# Patient Record
Sex: FEMALE | Race: BLACK OR AFRICAN AMERICAN | Employment: UNEMPLOYED | URBAN - METROPOLITAN AREA
[De-identification: names, ages, dates, MRNs, and addresses within clinical notes are randomized per-mention and may not be internally consistent; named-entity substitution may affect disease eponyms.]

---

## 2019-01-29 ENCOUNTER — OFFICE VISIT (OUTPATIENT)
Dept: FAMILY MEDICINE CLINIC | Age: 29
End: 2019-01-29

## 2019-01-29 VITALS
DIASTOLIC BLOOD PRESSURE: 73 MMHG | WEIGHT: 150.2 LBS | SYSTOLIC BLOOD PRESSURE: 122 MMHG | RESPIRATION RATE: 18 BRPM | BODY MASS INDEX: 27.64 KG/M2 | HEIGHT: 62 IN | TEMPERATURE: 97.5 F | HEART RATE: 74 BPM | OXYGEN SATURATION: 98 %

## 2019-01-29 DIAGNOSIS — R73.01 IMPAIRED FASTING GLUCOSE: ICD-10-CM

## 2019-01-29 DIAGNOSIS — Z11.3 ROUTINE SCREENING FOR STI (SEXUALLY TRANSMITTED INFECTION): ICD-10-CM

## 2019-01-29 DIAGNOSIS — D50.9 IRON DEFICIENCY ANEMIA, UNSPECIFIED IRON DEFICIENCY ANEMIA TYPE: ICD-10-CM

## 2019-01-29 DIAGNOSIS — Z76.89 ENCOUNTER TO ESTABLISH CARE: ICD-10-CM

## 2019-01-29 DIAGNOSIS — F41.8 DEPRESSION WITH ANXIETY: ICD-10-CM

## 2019-01-29 DIAGNOSIS — N94.6 DYSMENORRHEA: Primary | ICD-10-CM

## 2019-01-29 NOTE — PATIENT INSTRUCTIONS
Patient has a history of anemia and she will go for lab testing as ordered  The patient has a history of impaired fasting glucose and she will go for lab testing as ordered  I have recommended to the patient that she might seek counseling services at any of the following locations given her history of depression with anxiety. If at any time she finds that this is not sufficient I will be happy to discuss with her medication management as well which I am happy to do in this office    For Psychology/Psychiatry, can call:  Jonathan White Fermin Raji  [address] [place] [address]  [id number]  [phone]    Candelaria Thornton  [address] [facility]   [phone] OR [phone] [facility]      The patient has admitted she would like to lose a little bit of weight and I encouraged her on exercise and following a healthy diet. A moderate amount of weight loss is reasonable, this can be achieved by eliminating 3-500 zeny daily. Given that she is still breast-feeding I would be mindful that rapid weight loss can take her milk supply so she should be cautious about this    The patient admits to dysmenorrhea and also a history of ovarian cyst.  She is status post tubal ligation. I recommended to her that initially she start with ibuprofen, 600 mg every 6-8 hours as needed for abdominal cramping. If this is not sufficient we can explore other options.   At this time she does not want to see gynecology however it is appropriate in the future I will be happy to refer her    She is due for a Pap smear and we will schedule this at her convenience    Routine STI testing today    Call with questions or concerns
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
## 2019-01-29 NOTE — PROGRESS NOTES
Pipo Rosas is a 33 y.o. female    Patient states that at times she has cervical pain 10/10, no pain at this time. Chief Complaint   Patient presents with    New Patient    Establish Care       1. Have you been to the ER, urgent care clinic since your last visit? Hospitalized since your last visit? [facility] left 2018  M  2. Have you seen or consulted any other health care providers outside of the [address] [place] [address] since your last visit? Include any pap smears or colon screening. No      Visit Vitals  /73 (BP 1 Location: Right arm, BP Patient Position: Sitting)   Pulse 74   Temp 97.5 °F (36.4 °C) (Oral)   Resp 18   Ht 5' 2\" (1.575 m)   Wt 150 lb 3.2 oz (68.1 kg)   SpO2 98%   BMI 27.47 kg/m²           Health Maintenance Due   Topic Date Due    Pneumococcal 19-64 Medium Risk (1 of 1 - PPSV23) 12/21/2008    DTaP/Tdap/Td series (1 - Tdap) 12/21/2010    PAP AKA CERVICAL CYTOLOGY  05/01/2016    Influenza Age 9 to Adult  08/01/2018         Medication Reconciliation completed, changes noted.   Please  Update medication list.

## 2019-03-21 ENCOUNTER — OFFICE VISIT (OUTPATIENT)
Dept: FAMILY MEDICINE CLINIC | Age: 29
End: 2019-03-21

## 2019-03-21 VITALS
BODY MASS INDEX: 27.12 KG/M2 | HEIGHT: 62 IN | OXYGEN SATURATION: 98 % | RESPIRATION RATE: 18 BRPM | DIASTOLIC BLOOD PRESSURE: 73 MMHG | HEART RATE: 73 BPM | WEIGHT: 147.4 LBS | TEMPERATURE: 97.3 F | SYSTOLIC BLOOD PRESSURE: 118 MMHG

## 2019-03-21 DIAGNOSIS — Z12.4 SCREENING FOR CERVICAL CANCER: ICD-10-CM

## 2019-03-21 DIAGNOSIS — Z01.419 WELL WOMAN EXAM WITH ROUTINE GYNECOLOGICAL EXAM: Primary | ICD-10-CM

## 2019-03-21 DIAGNOSIS — Z11.3 ROUTINE SCREENING FOR STI (SEXUALLY TRANSMITTED INFECTION): ICD-10-CM

## 2019-03-21 DIAGNOSIS — Z12.39 SCREENING FOR MALIGNANT NEOPLASM OF BREAST: ICD-10-CM

## 2019-03-21 RX ORDER — BUPROPION HYDROCHLORIDE 150 MG/1
150 TABLET, EXTENDED RELEASE ORAL DAILY
Qty: 30 TAB | Refills: 0 | Status: SHIPPED | OUTPATIENT
Start: 2019-03-21 | End: 2019-05-11 | Stop reason: SDUPTHER
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
## 2019-03-21 NOTE — PROGRESS NOTES
Eliseo Gutierrez is a 33 y.o. female Chief Complaint Patient presents with  Complete Physical  
 Gyn Exam  
 
 
1. Have you been to the ER, urgent care clinic since your last visit? Hospitalized since your last visit? No 
M 
2. Have you seen or consulted any other health care providers outside of the [address] [place] [address] since your last visit? Include any pap smears or colon screening. No 
 
 
Visit Vitals Resp 18 Ht 5' 2\" (1.575 m) Wt 147 lb 6.4 oz (66.9 kg) SpO2 98% BMI 26.96 kg/m² Health Maintenance Due Topic Date Due  
 PAP AKA CERVICAL CYTOLOGY  05/01/2016  Influenza Age 5 to Adult  08/01/2018 Medication Reconciliation completed, changes noted.   Please  Update medication list.

## 2019-03-21 NOTE — PATIENT INSTRUCTIONS
For Psychology/Psychiatry, can call: 
[facility] [address] [facility] [address] [place] [phone] [facility] [address] Hilary Broderick [phone] [address] [place] [address] 
 [phone] OR [phone] [address], [address] [place] [address] [address] PHONE [phone] FAX [phone]

## 2019-03-23 LAB
ALBUMIN SERPL-MCNC: 4.8 G/DL (ref 3.5–5.5)
ALBUMIN/GLOB SERPL: 1.6 {RATIO} (ref 1.2–2.2)
ALP SERPL-CCNC: 149 IU/L (ref 39–117)
ALT SERPL-CCNC: 14 IU/L (ref 0–32)
AST SERPL-CCNC: 17 IU/L (ref 0–40)
BASOPHILS # BLD AUTO: 0 X10E3/UL (ref 0–0.2)
BASOPHILS NFR BLD AUTO: 0 %
BILIRUB SERPL-MCNC: 0.4 MG/DL (ref 0–1.2)
BUN SERPL-MCNC: 12 MG/DL (ref 6–20)
BUN/CREAT SERPL: 17 (ref 9–23)
CALCIUM SERPL-MCNC: 9.6 MG/DL (ref 8.7–10.2)
CHLORIDE SERPL-SCNC: 104 MMOL/L (ref 96–106)
CHOLEST SERPL-MCNC: 172 MG/DL (ref 100–199)
CO2 SERPL-SCNC: 23 MMOL/L (ref 20–29)
CREAT SERPL-MCNC: 0.71 MG/DL (ref 0.57–1)
EOSINOPHIL # BLD AUTO: 0.1 X10E3/UL (ref 0–0.4)
EOSINOPHIL NFR BLD AUTO: 2 %
ERYTHROCYTE [DISTWIDTH] IN BLOOD BY AUTOMATED COUNT: 14.6 % (ref 12.3–15.4)
EST. AVERAGE GLUCOSE BLD GHB EST-MCNC: 123 MG/DL
GLOBULIN SER CALC-MCNC: 3 G/DL (ref 1.5–4.5)
GLUCOSE SERPL-MCNC: 73 MG/DL (ref 65–99)
HBA1C MFR BLD: 5.9 % (ref 4.8–5.6)
HCT VFR BLD AUTO: 39.8 % (ref 34–46.6)
HDLC SERPL-MCNC: 62 MG/DL
HGB BLD-MCNC: 13 G/DL (ref 11.1–15.9)
HIV 1+2 AB+HIV1 P24 AG SERPL QL IA: NON REACTIVE
IMM GRANULOCYTES # BLD AUTO: 0 X10E3/UL (ref 0–0.1)
IMM GRANULOCYTES NFR BLD AUTO: 0 %
INTERPRETATION, 910389: NORMAL
IRON SATN MFR SERPL: 39 % (ref 15–55)
IRON SERPL-MCNC: 105 UG/DL (ref 27–159)
LDLC SERPL CALC-MCNC: 101 MG/DL (ref 0–99)
LYMPHOCYTES # BLD AUTO: 2 X10E3/UL (ref 0.7–3.1)
LYMPHOCYTES NFR BLD AUTO: 46 %
MCH RBC QN AUTO: 26.5 PG (ref 26.6–33)
MCHC RBC AUTO-ENTMCNC: 32.7 G/DL (ref 31.5–35.7)
MCV RBC AUTO: 81 FL (ref 79–97)
MONOCYTES # BLD AUTO: 0.5 X10E3/UL (ref 0.1–0.9)
MONOCYTES NFR BLD AUTO: 11 %
NEUTROPHILS # BLD AUTO: 1.8 X10E3/UL (ref 1.4–7)
NEUTROPHILS NFR BLD AUTO: 41 %
PLATELET # BLD AUTO: 200 X10E3/UL (ref 150–379)
POTASSIUM SERPL-SCNC: 4.1 MMOL/L (ref 3.5–5.2)
PROT SERPL-MCNC: 7.8 G/DL (ref 6–8.5)
RBC # BLD AUTO: 4.9 X10E6/UL (ref 3.77–5.28)
RPR SER QL: NON REACTIVE
SODIUM SERPL-SCNC: 143 MMOL/L (ref 134–144)
TIBC SERPL-MCNC: 269 UG/DL (ref 250–450)
TRIGL SERPL-MCNC: 46 MG/DL (ref 0–149)
TSH SERPL DL<=0.005 MIU/L-ACNC: 0.93 UIU/ML (ref 0.45–4.5)
UIBC SERPL-MCNC: 164 UG/DL (ref 131–425)
VLDLC SERPL CALC-MCNC: 9 MG/DL (ref 5–40)
WBC # BLD AUTO: 4.4 X10E3/UL (ref 3.4–10.8)

## 2019-03-24 LAB
C TRACH RRNA SPEC QL NAA+PROBE: NEGATIVE
CYTOLOGIST CVX/VAG CYTO: NORMAL
CYTOLOGY CVX/VAG DOC THIN PREP: NORMAL
DX ICD CODE: NORMAL
HPV I/H RISK 4 DNA CVX QL PROBE+SIG AMP: NEGATIVE
Lab: NORMAL
N GONORRHOEA RRNA SPEC QL NAA+PROBE: NEGATIVE
OTHER STN SPEC: NORMAL
PATH REPORT.FINAL DX SPEC: NORMAL
STAT OF ADQ CVX/VAG CYTO-IMP: NORMAL

## 2019-03-25 NOTE — PROGRESS NOTES
Diabetes screening test in pre-diabetic range, need to keep an eye on this, be mindful of diet and try to keep up with exercise  Cholesterol boderline but healthy cholesterol overall great  Electrolytes, liver and kidney function all OK  Thyroid normal  STD screening negative  Blood counts stable, iron normal  Pap and HPV negative

## 2019-03-27 ENCOUNTER — TELEPHONE (OUTPATIENT)
Dept: FAMILY MEDICINE CLINIC | Age: 29
End: 2019-03-27

## 2019-03-28 NOTE — TELEPHONE ENCOUNTER
Labs were reviewed, letter was mailed it looks like, would you follow up with patient when you have a chance

## 2019-03-28 NOTE — TELEPHONE ENCOUNTER
Called number advised to call by pt, however, number was not in service and per voice mail, was advised to call [phone]. Called number advised, which was Mr. Blankenship, and he states pt is not with him, and I need to call her phone.

## 2019-04-30 ENCOUNTER — OFFICE VISIT (OUTPATIENT)
Dept: FAMILY MEDICINE CLINIC | Age: 29
End: 2019-04-30

## 2019-04-30 VITALS
WEIGHT: 148 LBS | RESPIRATION RATE: 18 BRPM | HEART RATE: 85 BPM | DIASTOLIC BLOOD PRESSURE: 73 MMHG | TEMPERATURE: 98.2 F | HEIGHT: 62 IN | SYSTOLIC BLOOD PRESSURE: 110 MMHG | BODY MASS INDEX: 27.23 KG/M2 | OXYGEN SATURATION: 100 %

## 2019-04-30 DIAGNOSIS — F32.0 DEPRESSION, MAJOR, SINGLE EPISODE, MILD (HCC): Primary | ICD-10-CM

## 2019-04-30 DIAGNOSIS — R10.2 PELVIC PAIN: ICD-10-CM

## 2019-04-30 RX ORDER — ESTRADIOL 0.1 MG/G
CREAM VAGINAL
Qty: 42.5 G | Refills: 0 | Status: SHIPPED | OUTPATIENT
Start: 2019-04-30
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
## 2019-04-30 NOTE — PROGRESS NOTES
Chief Complaint Patient presents with  Depression Follow up 1. Have you been to the ER, urgent care clinic since your last visit? Hospitalized since your last visit? No 
 
2. Have you seen or consulted any other health care providers outside of the [address] [place] [address] since your last visit? Include any pap smears or colon screening.  No

## 2019-04-30 NOTE — PATIENT INSTRUCTIONS
For Psychology/Psychiatry, can call: 
[facility] [address] [place] [phone] [facility] [address] [phone] [address] [place] [address] 
 [phone] OR [phone] [address], [address] [place] [address] [address] PHONE [phone] FAX [phone] [address] [facility] Phone: [phone]

## 2019-05-02 DIAGNOSIS — Z34.80 SUPERVISION OF OTHER NORMAL PREGNANCY: ICD-10-CM

## 2019-05-02 DIAGNOSIS — J45.909 ASTHMA: ICD-10-CM

## 2019-05-02 DIAGNOSIS — R06.2 WHEEZING: ICD-10-CM

## 2019-05-02 RX ORDER — FLUTICASONE PROPIONATE 110 UG/1
2 AEROSOL, METERED RESPIRATORY (INHALATION) 2 TIMES DAILY
Qty: 1 INHALER | Refills: 2 | Status: SHIPPED | OUTPATIENT
Start: 2019-05-02 | End: 2021-02-22 | Stop reason: SDUPTHER

## 2019-05-02 RX ORDER — ALBUTEROL SULFATE 90 UG/1
2 AEROSOL, METERED RESPIRATORY (INHALATION)
Qty: 1 INHALER | Refills: 1 | Status: SHIPPED | OUTPATIENT
Start: 2019-05-02 | End: 2021-02-22 | Stop reason: SDUPTHER

## 2019-05-02 NOTE — TELEPHONE ENCOUNTER
----- Message from [facility] sent at 5/1/2019  6:35 PM EDT -----  Regarding: Dr. Leonardo Payne  Pt [phone] needs her inhalers called into [facility] [phone]. Pt is completely out of the meds. And has been waiting for the refills.

## 2019-05-06 ENCOUNTER — TELEPHONE (OUTPATIENT)
Dept: FAMILY MEDICINE CLINIC | Age: 29
End: 2019-05-06

## 2019-05-14 RX ORDER — BUPROPION HYDROCHLORIDE 150 MG/1
TABLET, EXTENDED RELEASE ORAL
Qty: 30 TAB | Refills: 0 | Status: SHIPPED | OUTPATIENT
Start: 2019-05-14 | End: 2019-09-24

## 2019-09-24 ENCOUNTER — OFFICE VISIT (OUTPATIENT)
Dept: FAMILY MEDICINE CLINIC | Age: 29
End: 2019-09-24

## 2019-09-24 VITALS
RESPIRATION RATE: 16 BRPM | OXYGEN SATURATION: 100 % | BODY MASS INDEX: 26.13 KG/M2 | TEMPERATURE: 97.5 F | SYSTOLIC BLOOD PRESSURE: 107 MMHG | WEIGHT: 142 LBS | HEIGHT: 62 IN | DIASTOLIC BLOOD PRESSURE: 81 MMHG | HEART RATE: 85 BPM

## 2019-09-24 DIAGNOSIS — F32.0 DEPRESSION, MAJOR, SINGLE EPISODE, MILD (HCC): Primary | ICD-10-CM

## 2019-09-24 DIAGNOSIS — F41.0 ANXIETY ATTACK: ICD-10-CM

## 2019-09-24 PROBLEM — Z98.891 HISTORY OF CESAREAN SECTION: Status: ACTIVE | Noted: 2017-11-12

## 2019-09-24 PROBLEM — K21.9 GERD (GASTROESOPHAGEAL REFLUX DISEASE): Status: ACTIVE | Noted: 2017-08-23

## 2019-09-24 PROBLEM — D50.0 IRON DEFICIENCY ANEMIA DUE TO CHRONIC BLOOD LOSS: Status: ACTIVE | Noted: 2017-11-02

## 2019-09-24 PROBLEM — I83.93 VARICOSE VEINS OF BOTH LOWER EXTREMITIES: Status: ACTIVE | Noted: 2018-01-25

## 2019-09-24 RX ORDER — HYDROXYZINE 25 MG/1
25 TABLET, FILM COATED ORAL
Qty: 30 TAB | Refills: 0 | Status: SHIPPED | OUTPATIENT
Start: 2019-09-24 | End: 2019-10-04

## 2019-09-24 RX ORDER — CITALOPRAM 20 MG/1
20 TABLET, FILM COATED ORAL DAILY
Qty: 30 TAB | Refills: 1 | Status: SHIPPED | OUTPATIENT
Start: 2019-09-24 | End: 2019-10-16 | Stop reason: SDUPTHER

## 2019-09-24 NOTE — PATIENT INSTRUCTIONS
Here today to discuss ongoing depression, depression and anxiety is not improved, took Wellbutrin for about a month but it was not sufficient for control of symptoms and in fact may have exacerbated her anxiety symptoms. After discussion today we will go ahead and trial Celexa. I have indicated to the patient that this is indicated for control of both anxiety and depression however we may need to recheck higher dose of the medicine to get good control of her anxiety. We will start at 20 mg daily and she may increase to 40 mg after seeing me in about a month if she is tolerating the medicine and is helping. It may be good to take this medicine initially at her bedtime. It is a once daily medicine and last for 24 hours For breakthrough anxiety in the intervening time I will give her a short course of hydroxyzine. She may take this as needed but I advised her that it may make her very sleepy Both of these medications are okay for breast-feeding Flu shot today

## 2019-09-24 NOTE — PROGRESS NOTES
Chief Complaint   Patient presents with    Depression     Follow up     1. Have you been to the ER, urgent care clinic since your last visit? Hospitalized since your last visit? No    2. Have you seen or consulted any other health care providers outside of the [address] [place] [address] since your last visit? Include any pap smears or colon screening.  No
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
## 2019-10-16 ENCOUNTER — OFFICE VISIT (OUTPATIENT)
Dept: FAMILY MEDICINE CLINIC | Age: 29
End: 2019-10-16

## 2019-10-16 VITALS
HEART RATE: 94 BPM | WEIGHT: 142 LBS | BODY MASS INDEX: 26.13 KG/M2 | TEMPERATURE: 98.2 F | OXYGEN SATURATION: 100 % | SYSTOLIC BLOOD PRESSURE: 119 MMHG | DIASTOLIC BLOOD PRESSURE: 69 MMHG | RESPIRATION RATE: 18 BRPM | HEIGHT: 62 IN

## 2019-10-16 DIAGNOSIS — F41.0 ANXIETY ATTACK: ICD-10-CM

## 2019-10-16 DIAGNOSIS — F32.0 DEPRESSION, MAJOR, SINGLE EPISODE, MILD (HCC): Primary | ICD-10-CM

## 2019-10-16 RX ORDER — CITALOPRAM 40 MG/1
40 TABLET, FILM COATED ORAL DAILY
Qty: 90 TAB | Refills: 0 | Status: SHIPPED | OUTPATIENT
Start: 2019-10-16 | End: 2020-01-28

## 2019-10-16 NOTE — LETTER
NOTIFICATION RETURN TO WORK / SCHOOL 
 
10/16/2019 4:00 PM 
 
Ms. Royal Garcia Colon [address] [phone] To Whom It May Concern: 
 
Mickie García is currently under the care of [address] [place] [address]. She will return to work/school on: 10/16/19 Patient was seen in the office today and has been instructed to follow a treatment plan. If there are questions or concerns please have the patient contact our office.  
 
 
 
Sincerely, 
 
 
Georgie Saunders MD

## 2019-10-16 NOTE — PATIENT INSTRUCTIONS
Symptoms of anxiety do not seem to be controlled and seems to be provoked when the patient is away from her children trying to work  She had worsening symptoms of anxiety today  I discussed with her that we should try to increase the medication that she is on to the maximal dose to see if she gets better results. She also has another medicine on hand which can help with cases of acute anxiety which she has not taken but she could  I strongly encouraged her to seek therapy and counseling. Specifically cognitive behavioral therapy can help in cases of anxiety  They have given her a work note saying that she was seen today and that she has a treatment plan in place.   I cannot write her out indefinitely from work, we need to continue to reevaluate this  She will be seen again in 4 weeks or sooner on an as-needed basis

## 2019-10-16 NOTE — PROGRESS NOTES
Family Medicine Acute Visit Progress Note  Patient: Ariana Flores Colon  1990, 33 y.o., female  Encounter Date: 10/16/2019    ASSESSMENT & PLAN    ICD-10-CM ICD-9-CM    1. Depression, major, single episode, mild (HCC) F32.0 296.21    2. Anxiety attack F41.0 300.01        Orders Placed This Encounter    citalopram (CELEXA) 40 mg tablet     Sig: Take 1 Tab by mouth daily. Dispense:  90 Tab     Refill:  0       Patient Instructions   Symptoms of anxiety do not seem to be controlled and seems to be provoked when the patient is away from her children trying to work  She had worsening symptoms of anxiety today  I discussed with her that we should try to increase the medication that she is on to the maximal dose to see if she gets better results. She also has another medicine on hand which can help with cases of acute anxiety which she has not taken but she could  I strongly encouraged her to seek therapy and counseling. Specifically cognitive behavioral therapy can help in cases of anxiety  They have given her a work note saying that she was seen today and that she has a treatment plan in place. I cannot write her out indefinitely from work, we need to continue to reevaluate this  She will be seen again in 4 weeks or sooner on an as-needed basis      CHIEF COMPLAINT  Chief Complaint   Patient presents with    Anxiety       SUBJECTIVE  Frida Pa is a 33 y.o. female presenting today for follow up of anxiety that is acutely worse. At our last visit she had an interview at [employer]. She reports at work she was away from her kids (her mom was watching them ) and she felt anxious. She had to sit down and was unable to work and so ultimately did not keep the job. 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 Today was her first day. She felt anxious and that it was too much and so again she left work and she reports she is not sure if she is able to work now due to her anxiety.  She is hoping to schedule with psych and counseling, reports she did call proiders today  No SI/HI/AVH  Heart pounding, sweating, feeling of impending doom, no crying, no chest pain    Review of Systems  A 12 point review of systems was negative except as noted here or in the HPI. OBJECTIVE  Visit Vitals  /69 (BP 1 Location: Left arm, BP Patient Position: Sitting)   Pulse 94   Temp 98.2 °F (36.8 °C) (Oral)   Resp 18   Ht 5' 2\" (1.575 m)   Wt 142 lb (64.4 kg)   LMP 09/28/2019   SpO2 100%   BMI 25.97 kg/m²       Physical Exam   Constitutional: She is oriented to person, place, and time. She appears well-developed and well-nourished. No distress. NAD, Nontoxic, Appears Stated Age   HENT:   Head: Normocephalic and atraumatic. Mouth/Throat: Oropharynx is clear and moist.   Eyes: Conjunctivae and EOM are normal. Right eye exhibits no discharge. Left eye exhibits no discharge. No scleral icterus. Neck: Neck supple. No thyromegaly present. Cardiovascular: Normal rate, regular rhythm and normal heart sounds. No murmur heard. Pulmonary/Chest: Effort normal. No stridor. No respiratory distress. Abdominal: Soft. Bowel sounds are normal. She exhibits no distension. Musculoskeletal: She exhibits no edema or tenderness. Neurological: She is alert and oriented to person, place, and time. Grossly intact CN   Skin: Skin is warm and dry. No rash noted. She is not diaphoretic. Psychiatric: Her behavior is normal.   anxious   Nursing note and vitals reviewed. No results found for any visits on 10/16/19. HISTORICAL  PMH, PSH, FHX, SOCHX, ALLERGIES and MES were reviewed and updated today. Kisha Hoff MD  [facility]  10/16/19 3:49 PM    Portions of this note may have been populated using smart dictation software and may have \"sounds-like\" errors present. Pt was counseled on risks, benefits and alternatives of treatment options.  All questions were asked and answered and the patient was agreeable with the treatment plan as outlined.

## 2019-10-16 NOTE — PROGRESS NOTES
Chief Complaint   Patient presents with    Anxiety     1. Have you been to the ER, urgent care clinic since your last visit? Hospitalized since your last visit? No    2. Have you seen or consulted any other health care providers outside of the [address] [place] [address] since your last visit? Include any pap smears or colon screening. No    Patient declined flu vaccine.

## 2019-11-11 RX ORDER — HYDROXYZINE 25 MG/1
25 TABLET, FILM COATED ORAL
Qty: 30 TAB | Refills: 0 | Status: SHIPPED | OUTPATIENT
Start: 2019-11-11 | End: 2019-11-21

## 2019-11-11 NOTE — TELEPHONE ENCOUNTER
Pt called for refill on Hydroxyzine. Has an appointment with Dr Bryson Bumpers 11/13/19 but states she only has 1 pill left. Verified [facility] [address].

## 2019-11-13 ENCOUNTER — OFFICE VISIT (OUTPATIENT)
Dept: FAMILY MEDICINE CLINIC | Age: 29
End: 2019-11-13

## 2019-11-13 VITALS
TEMPERATURE: 97.7 F | HEART RATE: 72 BPM | SYSTOLIC BLOOD PRESSURE: 113 MMHG | WEIGHT: 135.4 LBS | HEIGHT: 62 IN | BODY MASS INDEX: 24.92 KG/M2 | RESPIRATION RATE: 20 BRPM | OXYGEN SATURATION: 100 % | DIASTOLIC BLOOD PRESSURE: 64 MMHG

## 2019-11-13 DIAGNOSIS — F32.0 DEPRESSION, MAJOR, SINGLE EPISODE, MILD (HCC): Primary | ICD-10-CM

## 2019-11-13 DIAGNOSIS — F41.9 ANXIETY: ICD-10-CM

## 2019-11-13 DIAGNOSIS — R73.01 IMPAIRED FASTING GLUCOSE: ICD-10-CM

## 2019-11-13 DIAGNOSIS — M67.441 MUCOUS CYST OF DIGIT OF RIGHT HAND: ICD-10-CM

## 2019-11-13 NOTE — PATIENT INSTRUCTIONS
For Psychology/Psychiatry, can call:  [facility]  [address]  [address]  [id number]  [phone]    [address] [place]  [address]   [phone] OR [phone] Counseling   [address]  [place], [address] [facility]  PHONE [phone]    [employer] (put in your insurance, your zip code and it helps you find the type of provider you're looking for)    For now I recommend you continue with Celexa at 40 mg a day and it is okay to use the hydroxyzine periodically but I do not want it used more than as prescribed  I recommend counseling and therapy, I recommend psychiatry evaluation  We did consider BuSpar however this is not considered safe in pregnancy based on my review of the literature using both up-to-date and NIH lactamed    With regards to the digital mucous cyst, I do not believe that this is an infection, I recommend we leave it alone, if it drains or bothers her she could see a hand surgeon however it is not red, warm, draining, and I believe it is likely posttraumatic    She is due for follow-up blood sugar check and I have ordered an A1c
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
## 2019-11-13 NOTE — PROGRESS NOTES
Chief Complaint   Patient presents with    Depression     Follow up     1. Have you been to the ER, urgent care clinic since your last visit? Hospitalized since your last visit? No    2. Have you seen or consulted any other health care providers outside of the [address] [place] [address] since your last visit? Include any pap smears or colon screening.  No

## 2020-01-28 ENCOUNTER — OFFICE VISIT (OUTPATIENT)
Dept: FAMILY MEDICINE CLINIC | Age: 30
End: 2020-01-28

## 2020-01-28 VITALS
TEMPERATURE: 97.4 F | OXYGEN SATURATION: 99 % | HEART RATE: 77 BPM | RESPIRATION RATE: 16 BRPM | BODY MASS INDEX: 23.22 KG/M2 | SYSTOLIC BLOOD PRESSURE: 109 MMHG | HEIGHT: 62 IN | DIASTOLIC BLOOD PRESSURE: 67 MMHG | WEIGHT: 126.2 LBS

## 2020-01-28 DIAGNOSIS — J06.9 VIRAL UPPER RESPIRATORY TRACT INFECTION: Primary | ICD-10-CM

## 2020-01-28 RX ORDER — FLUTICASONE PROPIONATE 50 MCG
2 SPRAY, SUSPENSION (ML) NASAL DAILY
Qty: 1 BOTTLE | Refills: 1 | Status: SHIPPED | OUTPATIENT
Start: 2020-01-28

## 2020-01-28 NOTE — PROGRESS NOTES
Family Medicine Acute Visit Progress Note  Patient: Allie Blankenship  1990, 26 y.o., female  Encounter Date: 2020    ASSESSMENT & PLAN    ICD-10-CM ICD-9-CM    1. Viral upper respiratory tract infection J06.9 465.9        Orders Placed This Encounter    fluticasone propionate (FLONASE) 50 mcg/actuation nasal spray     Si Sprays by Both Nostrils route daily. Dispense:  1 Bottle     Refill:  1       Patient Instructions   Upper respiratory infections can be both bacterial and viral but in this case we suspect viral. Antibiotics are only indicated when bacterial infections are either strongly suspected or confirmed. Supportive care is appropriate in both cases. Patients with URIs benefit from humidified air, increased fluid consumption, over the counter pain and fever reducers (Ibuprofen, acetaminophen). If not contraindicated, may also use over the counter cough/cold medications, however patients with high blood pressure or risk factors for stroke should not use decongestant medications such as phenylephrine or pseudoephedrine. Nasal saline rinses are appropriate for use, and in general the use of Fluticasone nasal spray (2 sprays in each nostril once daily) can help with congestion--this is available over the counter. For sore throat over the counter cough drops and sore throat drops (for example Cepacol or Chloraseptic) can be used as well as salt water gargles and hot or cold beverages for comfort as needed. Over the counter cough medications can be used, as can honey for cough suppression. If symptoms are not improved by 10-14 days or are improving then acutely worsen, patient is recommended to return to the office for re-evaluation of symptoms. CHIEF COMPLAINT  Chief Complaint   Patient presents with    Cold Symptoms     x 5 days       SUBJECTIVE  Vidal Posada is a 26 y.o. female presenting today for URI symptoms.  Coughing, sneezing, headache, nose running, feeling tired and weak. No fever at home. Sick kids at home (coughing, headache, diarrrhea and kid with a fever last night.)  No upset stomach, diarrhea, but poor appetite. complianing of dry mouth  Nothing makes better or worse  Seems to be worsening over the last 3-5 d    Review of Systems  A 12 point review of systems was negative except as noted here or in the HPI. OBJECTIVE  Visit Vitals  /67 (BP 1 Location: Left arm, BP Patient Position: Sitting)   Pulse 77   Temp 97.4 °F (36.3 °C)   Resp 16   Ht 5' 2\" (1.575 m)   Wt 126 lb 3.2 oz (57.2 kg)   LMP 01/25/2020   SpO2 99%   BMI 23.08 kg/m²       Physical Exam  Vitals signs and nursing note reviewed. Constitutional:       General: She is not in acute distress. Appearance: She is well-developed. She is not diaphoretic. Comments: Appears stated age   HENT:      Head: Normocephalic and atraumatic. Right Ear: External ear normal. There is no impacted cerumen. Left Ear: External ear normal. There is no impacted cerumen. Nose: Congestion present. Comments: Boggy nares, edematous nasal passages     Mouth/Throat:      Mouth: Mucous membranes are moist.      Pharynx: Posterior oropharyngeal erythema (without exudates or concretions) present. Eyes:      General: No scleral icterus. Right eye: No discharge. Left eye: No discharge. Extraocular Movements: Extraocular movements intact. Conjunctiva/sclera: Conjunctivae normal.      Pupils: Pupils are equal, round, and reactive to light. Neck:      Musculoskeletal: Normal range of motion and neck supple. Cardiovascular:      Rate and Rhythm: Normal rate and regular rhythm. Heart sounds: Normal heart sounds. No murmur. No friction rub. No gallop. Pulmonary:      Effort: Pulmonary effort is normal. No respiratory distress. Breath sounds: Normal breath sounds. No stridor. No wheezing or rales.    Abdominal:      General: Bowel sounds are normal. There is no distension. Palpations: Abdomen is soft. Tenderness: There is no abdominal tenderness. Musculoskeletal:         General: No swelling. Right lower leg: No edema. Left lower leg: No edema. Lymphadenopathy:      Cervical: Cervical adenopathy present. Skin:     General: Skin is warm and dry. Capillary Refill: Capillary refill takes less than 2 seconds. Coloration: Skin is not pale. Findings: No erythema or rash. Neurological:      General: No focal deficit present. Mental Status: She is alert and oriented to person, place, and time. Mental status is at baseline. Gait: Gait normal.   Psychiatric:         Mood and Affect: Mood normal.         Behavior: Behavior normal.         Thought Content: Thought content normal.         Judgment: Judgment normal.         No results found for any visits on 01/28/20. HISTORICAL  PMH, PSH, FHX, SOCHX, ALLERGIES and MES were reviewed and updated today. Kari Pedro MD  [facility]  01/28/20 11:46 AM    Portions of this note may have been populated using smart dictation software and may have \"sounds-like\" errors present. Pt was counseled on risks, benefits and alternatives of treatment options. All questions were asked and answered and the patient was agreeable with the treatment plan as outlined.

## 2020-01-28 NOTE — PROGRESS NOTES
Chief Complaint   Patient presents with    Cold Symptoms     x 5 days     1. Have you been to the ER, urgent care clinic since your last visit? Hospitalized since your last visit? No      2. Have you seen or consulted any other health care providers outside of the [address] [place] [address] since your last visit? Include any pap smears or colon screening.  No

## 2020-07-06 ENCOUNTER — VIRTUAL VISIT (OUTPATIENT)
Dept: FAMILY MEDICINE CLINIC | Age: 30
End: 2020-07-06

## 2020-07-06 DIAGNOSIS — F41.0 ANXIETY ATTACK: ICD-10-CM

## 2020-07-06 DIAGNOSIS — I83.813 VARICOSE VEINS OF BOTH LOWER EXTREMITIES WITH PAIN: Primary | ICD-10-CM

## 2020-07-06 DIAGNOSIS — F41.9 ANXIETY: ICD-10-CM

## 2020-07-06 NOTE — PROGRESS NOTES
Chief Complaint   Patient presents with    Leg Pain     left leg and foot - right leg - 10/10     Pt is having virtual appointment at home in [place], [facility]

## 2020-07-06 NOTE — PROGRESS NOTES
Livier Morales is a 26 y.o. female who was seen by synchronous (real-time) audio-video technology on 7/6/2020. Consent: Donald Blankenship, who was seen by synchronous (real-time) audio-video technology, and/or her healthcare decision maker, is aware that this patient-initiated, Telehealth encounter on 7/6/2020 is a billable service, with coverage as determined by her insurance carrier. She is aware that she may receive a bill and has provided verbal consent to proceed: Yes. Assessment & Plan:   1. Varicose veins of both lower extremities with pain  Patient would like to see the vascular surgeon, I have made a referral as per her request  - REFERRAL TO VASCULAR SURGERY    2. Anxiety  I suspect the pain that radiated from her feet to her chest was anxiety related. I encouraged her to establish with counseling or therapy as we have previously discussed, practice mindfulness, exercise and also continue to eat a healthy diet. 3. Anxiety attack  As above          Pt was counseled on risks, benefits and alternatives of treatment options. All questions were asked and answered and the patient was agreeable with the treatment plan as outlined. Encounter time today was 20 minutes and more than 50% of this encounter was spent in counseling face-to-face regarding Diagnosis, Patient Education, Medication Management, Compliance and Impressions. Time documented includes face to face time, documentation and chart review if applicable except as noted. Subjective:   Livier Morales is a 26 y.o. female who was seen for Leg Pain (left leg and foot - right leg - 10/10)    The patient has a history of varicose veins, she has a hitory of bruising as well    She reports to me she was told when she lived in [place] that she would need to have her veins \"fixed\" or \"Bad things\" might happen. She has aching in her legs and she feels there is worsening of the varicosities.  She reports they have worsened with subsequent pregnancies. Last week had pain radiating from her feet to her chest, on further probing she agrees it might have been anxiety. She has not gotten set up yet with a therapist or counselor. She does intend to . She feels sad, overwhelmed at times. She's not trying to work now and so this isn't a trigger for her now as it was before. Medications, allergies, PMH, PSH, SOCH, [address] reviewed and updated per routine protocol, see chart for review and changes if not noted here. ROS  A 12 point review of systems was negative except as noted here or in the HPI. Objective:   Vital Signs: (As obtained by patient/caregiver at home)  There were no vitals taken for this visit.      [INSTRUCTIONS:  \"[x]\" Indicates a positive item  \"[]\" Indicates a negative item  -- DELETE ALL ITEMS NOT EXAMINED]    Constitutional: [x] Appears well-developed and well-nourished [x] No apparent distress      [] Abnormal -     Mental status: [x] Alert and awake  [x] Oriented to person/place/time [x] Able to follow commands    [] Abnormal -     Eyes:   EOM    [x]  Normal    [] Abnormal -   Sclera  [x]  Normal    [] Abnormal -          Discharge [x]  None visible   [] Abnormal -     HENT: [x] Normocephalic, atraumatic  [] Abnormal -   [x] Mouth/Throat: Mucous membranes are moist    External Ears [x] Normal  [] Abnormal -    Neck: [x] No visualized mass [] Abnormal -     Pulmonary/Chest: [x] Respiratory effort normal   [x] No visualized signs of difficulty breathing or respiratory distress        [] Abnormal -      Musculoskeletal:   [x] Normal gait with no signs of ataxia         [x] Normal range of motion of neck        [] Abnormal -     Neurological:        [x] No Facial Asymmetry (Cranial nerve 7 motor function) (limited exam due to video visit)          [x] No gaze palsy        [] Abnormal -          Skin:        [x] No significant exanthematous lesions or discoloration noted on facial skin         [] Abnormal - Psychiatric:       [x] Normal Affect [] Abnormal -        [x] No Hallucinations    Other pertinent observable physical exam findings:varicose vein L distal leg noted, bruising of R leg noted, ambulating fine    We discussed the expected course, resolution and complications of the diagnosis(es) in detail. Medication risks, benefits, costs, interactions, and alternatives were discussed as indicated. I advised her to contact the office if her condition worsens, changes or fails to improve as anticipated. She expressed understanding with the diagnosis(es) and plan. Alexander Berrios is a 26 y.o. female who was evaluated by a video visit encounter for concerns as above. Patient identification was verified prior to start of the visit. A caregiver was present when appropriate. Due to this being a TeleHealth encounter (During OOS-35 public health emergency), evaluation of the following organ systems was limited: Vitals/Constitutional/EENT/Resp/CV/GI//MS/Neuro/Skin/Heme-Lymph-Imm. Pursuant to the emergency declaration under the [facility], 1135 waiver authority and the [employer] Act, this Virtual  Visit was conducted, with patient's (and/or legal guardian's) consent, to reduce the patient's risk of exposure to COVID-19 and provide necessary medical care. Services were provided through a video synchronous discussion virtually to substitute for in-person clinic visit. Patient and provider were located at their individual homes. Viridiana Vargas MD  [facility]  07/06/20 11:34 AM     Portions of this note may have been populated using smart dictation software and may have \"sounds-like\" errors present.

## 2020-10-06 ENCOUNTER — APPOINTMENT (OUTPATIENT)
Dept: VASCULAR SURGERY | Age: 30
End: 2020-10-06
Attending: EMERGENCY MEDICINE
Payer: COMMERCIAL

## 2020-10-06 ENCOUNTER — HOSPITAL ENCOUNTER (EMERGENCY)
Age: 30
Discharge: HOME | End: 2020-10-06
Attending: EMERGENCY MEDICINE | Admitting: EMERGENCY MEDICINE
Payer: COMMERCIAL

## 2020-10-06 VITALS
SYSTOLIC BLOOD PRESSURE: 129 MMHG | WEIGHT: 123.68 LBS | BODY MASS INDEX: 22.62 KG/M2 | RESPIRATION RATE: 16 BRPM | DIASTOLIC BLOOD PRESSURE: 77 MMHG | HEART RATE: 61 BPM | OXYGEN SATURATION: 100 % | TEMPERATURE: 97.6 F

## 2020-10-06 DIAGNOSIS — M79.605 LEG PAIN, BILATERAL: Primary | ICD-10-CM

## 2020-10-06 DIAGNOSIS — M79.604 LEG PAIN, BILATERAL: Primary | ICD-10-CM

## 2020-10-06 LAB
ATRIAL RATE: 59 BPM
CALCULATED P AXIS, ECG09: 59 DEGREES
CALCULATED R AXIS, ECG10: 83 DEGREES
CALCULATED T AXIS, ECG11: 47 DEGREES
DIAGNOSIS, 93000: NORMAL
P-R INTERVAL, ECG05: 118 MS
Q-T INTERVAL, ECG07: 388 MS
QRS DURATION, ECG06: 74 MS
QTC CALCULATION (BEZET), ECG08: 384 MS
VENTRICULAR RATE, ECG03: 59 BPM

## 2020-10-06 PROCEDURE — 93970 EXTREMITY STUDY: CPT

## 2020-10-06 PROCEDURE — 93005 ELECTROCARDIOGRAM TRACING: CPT

## 2020-10-06 PROCEDURE — 99282 EMERGENCY DEPT VISIT SF MDM: CPT
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
## 2020-10-06 NOTE — DISCHARGE INSTRUCTIONS
Schedule an appointment with vascular surgery (referral from your primary care physician) as soon as possible    Use compression stockings and elevate legs whenever possible

## 2020-10-07 ENCOUNTER — PATIENT OUTREACH (OUTPATIENT)
Dept: CASE MANAGEMENT | Age: 30
End: 2020-10-07

## 2020-10-07 NOTE — PROGRESS NOTES
Patient contacted regarding recent discharge and COVID-19 risk. Discussed COVID-19 related testing which was not done at this time. Test results were not done. Patient informed of results, if available? n/a    Care Transition Nurse/ Ambulatory Care Manager/ LPN Care Coordinator contacted the patient by telephone to perform post discharge assessment. Verified name and  with patient as identifiers. Patient has following risk factors of: asthma. CTN/ACM/LPN reviewed discharge instructions, medical action plan and red flags related to discharge diagnosis. Reviewed and educated them on any new and changed medications related to discharge diagnosis. Advised obtaining a 90-day supply of all daily and as-needed medications. Advance Care Planning:   Does patient have an Advance Directive: health care decision makers updated    Education provided regarding infection prevention, and signs and symptoms of COVID-19 and when to seek medical attention with patient who verbalized understanding. Discussed exposure protocols and quarantine from [address] you at higher risk for severe illness  and given an opportunity for questions and concerns. The patient agrees to contact the COVID-19 hotline [phone] or PCP office for questions related to their healthcare. CTN/ACM/LPN provided contact information for future reference. From CDC: Are you at higher risk for severe illness?  Wash your hands often.  Avoid close contact (6 feet, which is about two arm lengths) with people who are sick.  Put distance between yourself and other people if COVID-19 is spreading in your community.  Clean and disinfect frequently touched surfaces.  Avoid all cruise travel and non-essential air travel.  Call your healthcare professional if you have concerns about COVID-19 and your underlying condition or if you are sick.     For more information on steps you can take to protect yourself, see CDC's How to Protect Yourself      Patient/family/caregiver given information for GetWell Loop and agrees to enroll yes  Patient's preferred e-mail:  [email or web address]  Patient's preferred phone number: [phone]   Based on Loop alert triggers, patient will be contacted by nurse care manager for worsening symptoms. Pt will be further monitored by COVID Loop Team based on severity of symptoms and risk factors.   Patient will call for PCP follow up

## 2021-02-22 DIAGNOSIS — Z34.80 SUPERVISION OF OTHER NORMAL PREGNANCY: ICD-10-CM

## 2021-02-22 DIAGNOSIS — R06.2 WHEEZING: ICD-10-CM

## 2021-02-22 DIAGNOSIS — J45.909 ASTHMA: ICD-10-CM

## 2021-02-22 RX ORDER — FLUTICASONE PROPIONATE 110 UG/1
2 AEROSOL, METERED RESPIRATORY (INHALATION) 2 TIMES DAILY
Qty: 1 INHALER | Refills: 2 | Status: SHIPPED | OUTPATIENT
Start: 2021-02-22

## 2021-02-22 RX ORDER — ALBUTEROL SULFATE 90 UG/1
2 AEROSOL, METERED RESPIRATORY (INHALATION)
Qty: 1 INHALER | Refills: 1 | Status: SHIPPED | OUTPATIENT
Start: 2021-02-22 | End: 2021-09-15

## 2021-03-01 ENCOUNTER — TELEPHONE (OUTPATIENT)
Dept: FAMILY MEDICINE CLINIC | Age: 31
End: 2021-03-01

## 2021-03-01 NOTE — TELEPHONE ENCOUNTER
Returned pt's call, but was unable to reach her and left message to call office. Daily    Pt returned call, requested phone number for previous PCP, was given contact information and reminded to check to see if she is par with medicaid.  Daily

## 2021-03-01 NOTE — TELEPHONE ENCOUNTER
----- Message from Alicia Pulido sent at 3/1/2021 11:32 AM EST -----  Regarding: General Medical  Contact: [phone]  General Message/Vendor Calls    Caller's first and last name: NA      Reason for call: Requesting to speak with [facility]. Callback required yes/no and why: Yes, requesting call back from [facility] for some information. Best contact number(s): [phone]      Details to clarify the request: Patient wouldn't disclose information but is requesting to have [facility] return call for a quick question.        Alicia Pulido

## 2021-03-29 NOTE — TELEPHONE ENCOUNTER
Per verbal order by Dr. Dinesh Michel, this can happen when thyroid is overactive (hyperthyroidism), would not be the only symptom with hyperthyroidism. Most likely not related to thyroid, labs were recently checked in January which were in a good range. Pt should follow up with PCP or dermatology. Patient notified via [facility]. Pt called for refills

## 2021-09-15 DIAGNOSIS — R06.2 WHEEZING: ICD-10-CM

## 2021-09-15 RX ORDER — ALBUTEROL SULFATE 90 UG/1
AEROSOL, METERED RESPIRATORY (INHALATION)
Qty: 6.7 EACH | Refills: 1 | Status: SHIPPED | OUTPATIENT
Start: 2021-09-15

## 2021-09-16 NOTE — TELEPHONE ENCOUNTER
Called pt, and left a voice message, she is due for her follow up with Dr. Meagan Collins. Asked pt that she call the office back to schedule when able.

## 2022-03-19 PROBLEM — I83.93 VARICOSE VEINS OF BOTH LOWER EXTREMITIES: Status: ACTIVE | Noted: 2018-01-25

## 2022-03-19 PROBLEM — F32.0 DEPRESSION, MAJOR, SINGLE EPISODE, MILD (HCC): Status: ACTIVE | Noted: 2019-04-30

## 2022-03-19 PROBLEM — D50.0 IRON DEFICIENCY ANEMIA DUE TO CHRONIC BLOOD LOSS: Status: ACTIVE | Noted: 2017-11-02

## 2022-03-19 PROBLEM — K21.9 GERD (GASTROESOPHAGEAL REFLUX DISEASE): Status: ACTIVE | Noted: 2017-08-23

## 2022-03-20 PROBLEM — Z98.891 HISTORY OF CESAREAN SECTION: Status: ACTIVE | Noted: 2017-11-12

## 2023-05-10 RX ORDER — ESTRADIOL 0.1 MG/G
CREAM VAGINAL
COMMUNITY
Start: 2019-04-30

## 2023-05-10 RX ORDER — FLUTICASONE PROPIONATE 50 MCG
2 SPRAY, SUSPENSION (ML) NASAL DAILY
COMMUNITY
Start: 2020-01-28

## 2023-05-10 RX ORDER — ALBUTEROL SULFATE 90 UG/1
AEROSOL, METERED RESPIRATORY (INHALATION)
COMMUNITY
Start: 2021-09-15

## 2023-05-10 RX ORDER — FLUTICASONE PROPIONATE 110 UG/1
2 AEROSOL, METERED RESPIRATORY (INHALATION) 2 TIMES DAILY
COMMUNITY
Start: 2021-02-22

## 2023-07-14 ENCOUNTER — APPOINTMENT (OUTPATIENT)
Facility: HOSPITAL | Age: 33
End: 2023-07-14
Payer: COMMERCIAL

## 2023-07-14 ENCOUNTER — HOSPITAL ENCOUNTER (EMERGENCY)
Facility: HOSPITAL | Age: 33
Discharge: HOME | End: 2023-07-14
Attending: EMERGENCY MEDICINE
Payer: COMMERCIAL

## 2023-07-14 VITALS
SYSTOLIC BLOOD PRESSURE: 131 MMHG | RESPIRATION RATE: 18 BRPM | TEMPERATURE: 98.4 F | BODY MASS INDEX: 24.66 KG/M2 | HEIGHT: 62 IN | OXYGEN SATURATION: 100 % | WEIGHT: 134 LBS | HEART RATE: 88 BPM | DIASTOLIC BLOOD PRESSURE: 75 MMHG

## 2023-07-14 DIAGNOSIS — R20.8 BURNING SENSATION: Primary | ICD-10-CM

## 2023-07-14 LAB
ALBUMIN SERPL-MCNC: 4.1 G/DL (ref 3.5–5)
ALBUMIN/GLOB SERPL: 0.9 (ref 1.1–2.2)
ALP SERPL-CCNC: 101 U/L (ref 45–117)
ALT SERPL-CCNC: 22 U/L (ref 12–78)
ANION GAP SERPL CALC-SCNC: 1 MMOL/L (ref 5–15)
AST SERPL-CCNC: 25 U/L (ref 15–37)
BASOPHILS # BLD: 0 K/UL (ref 0–0.1)
BASOPHILS NFR BLD: 0 % (ref 0–1)
BILIRUB SERPL-MCNC: 0.5 MG/DL (ref 0.2–1)
BUN SERPL-MCNC: 12 MG/DL (ref 6–20)
BUN/CREAT SERPL: 14 (ref 12–20)
CALCIUM SERPL-MCNC: 9.2 MG/DL (ref 8.5–10.1)
CHLORIDE SERPL-SCNC: 109 MMOL/L (ref 97–108)
CO2 SERPL-SCNC: 27 MMOL/L (ref 21–32)
CREAT SERPL-MCNC: 0.88 MG/DL (ref 0.55–1.02)
DIFFERENTIAL METHOD BLD: ABNORMAL
EOSINOPHIL # BLD: 0 K/UL (ref 0–0.4)
EOSINOPHIL NFR BLD: 1 % (ref 0–7)
ERYTHROCYTE [DISTWIDTH] IN BLOOD BY AUTOMATED COUNT: 13.7 % (ref 11.5–14.5)
GLOBULIN SER CALC-MCNC: 4.4 G/DL (ref 2–4)
GLUCOSE SERPL-MCNC: 83 MG/DL (ref 65–100)
HCT VFR BLD AUTO: 42.5 % (ref 35–47)
HGB BLD-MCNC: 13.7 G/DL (ref 11.5–16)
IMM GRANULOCYTES # BLD AUTO: 0 K/UL (ref 0–0.04)
IMM GRANULOCYTES NFR BLD AUTO: 0 % (ref 0–0.5)
LYMPHOCYTES # BLD: 1.1 K/UL (ref 0.8–3.5)
LYMPHOCYTES NFR BLD: 36 % (ref 12–49)
MCH RBC QN AUTO: 26.9 PG (ref 26–34)
MCHC RBC AUTO-ENTMCNC: 32.2 G/DL (ref 30–36.5)
MCV RBC AUTO: 83.5 FL (ref 80–99)
MONOCYTES # BLD: 0.4 K/UL (ref 0–1)
MONOCYTES NFR BLD: 14 % (ref 5–13)
NEUTS SEG # BLD: 1.5 K/UL (ref 1.8–8)
NEUTS SEG NFR BLD: 49 % (ref 32–75)
NRBC # BLD: 0 K/UL (ref 0–0.01)
NRBC BLD-RTO: 0 PER 100 WBC
PLATELET # BLD AUTO: 225 K/UL (ref 150–400)
PMV BLD AUTO: 10.8 FL (ref 8.9–12.9)
POTASSIUM SERPL-SCNC: 4.6 MMOL/L (ref 3.5–5.1)
PROT SERPL-MCNC: 8.5 G/DL (ref 6.4–8.2)
RBC # BLD AUTO: 5.09 M/UL (ref 3.8–5.2)
SODIUM SERPL-SCNC: 137 MMOL/L (ref 136–145)
WBC # BLD AUTO: 3.1 K/UL (ref 3.6–11)

## 2023-07-14 PROCEDURE — 36415 COLL VENOUS BLD VENIPUNCTURE: CPT

## 2023-07-14 PROCEDURE — 85025 COMPLETE CBC W/AUTO DIFF WBC: CPT

## 2023-07-14 PROCEDURE — 80053 COMPREHEN METABOLIC PANEL: CPT

## 2023-07-14 PROCEDURE — 99284 EMERGENCY DEPT VISIT MOD MDM: CPT

## 2023-07-14 PROCEDURE — 2580000003 HC RX 258: Performed by: NURSE PRACTITIONER

## 2023-07-14 PROCEDURE — 70450 CT HEAD/BRAIN W/O DYE: CPT

## 2023-07-14 RX ORDER — 0.9 % SODIUM CHLORIDE 0.9 %
1000 INTRAVENOUS SOLUTION INTRAVENOUS ONCE
Status: COMPLETED | OUTPATIENT
Start: 2023-07-14 | End: 2023-07-14

## 2023-07-14 RX ADMIN — SODIUM CHLORIDE 1000 ML: 9 INJECTION, SOLUTION INTRAVENOUS at 15:06

## 2023-07-14 ASSESSMENT — ENCOUNTER SYMPTOMS
TROUBLE SWALLOWING: 0
ABDOMINAL DISTENTION: 0
COLOR CHANGE: 0
ABDOMINAL PAIN: 0
NAUSEA: 0
VOMITING: 0
EYE PAIN: 0
SHORTNESS OF BREATH: 0
EYE REDNESS: 0

## 2023-07-14 NOTE — ED NOTES
Pt d/c by provider. Ambulatory out of ED in NAD w/ daughter.      Isaias Cervantes RN  07/14/23 5826

## 2023-07-14 NOTE — ED PROVIDER NOTES
with complaints of a heat sensation in her head that goes from front to back intermittently throughout the course of the day. Also states she has burning sensation in her feet that is intermittent. States this all started when she returned from [place]. States it was incredibly hot there and her  returned with the same symptoms. Denies any chest pain, shortness of breath, dizziness, headache, visual changes, speech difficulty. No unilateral weakness. Denies any nausea or vomiting, fevers or chills. Has not taken any medication prior to her arrival for her symptoms. No further complaints at this time. Due to the fact that patient is convinced there is a tumor or \"something really bad\" happening in her head, I decided to do a CT scan of her head. Laboratory data reassuring and within normal limits. CT head negative. Discharged home and follow-up with PCP. Return to the emergency room with worsening symptoms. Discussed with  who is in agreement with plan of care. Any available vitals, labs, images, nursing notes, medications, allergies, PMH, PSH and/or previous records in the chart were reviewed. All of these were considered in the medical decision making process. I individually reviewed any labs and any images obtained. This was discussed with my attending and he/she is in agreement with plan of care. Problems Addressed:  Burning sensation: acute illness or injury    Amount and/or Complexity of Data Reviewed  Labs: ordered. Decision-making details documented in ED Course. Radiology: ordered. Decision-making details documented in ED Course. Risk  Prescription drug management.             REASSESSMENT          CONSULTS:  None    PROCEDURES:     Procedures    Labs Reviewed   CBC WITH AUTO DIFFERENTIAL - Abnormal; Notable for the following components:       Result Value    WBC 3.1 (*)     Monocytes % 14 (*)     Neutrophils Absolute 1.5 (*)     All other components within normal

## 2023-07-14 NOTE — ED TRIAGE NOTES
Patient states for over the last week has had a sensation of heat in her head that goes from front to back since coming back from [place]. States also has the hot burning sensation in her feet that will start, then stop, then go back to the head. Denies headache. Denies pain anywhere. Denies changes in vision or speech.

## 2024-07-03 ENCOUNTER — HOSPITAL ENCOUNTER (EMERGENCY)
Facility: HOSPITAL | Age: 34
Discharge: HOME | End: 2024-07-03
Attending: EMERGENCY MEDICINE
Payer: COMMERCIAL

## 2024-07-03 ENCOUNTER — APPOINTMENT (OUTPATIENT)
Facility: HOSPITAL | Age: 34
End: 2024-07-03
Payer: COMMERCIAL

## 2024-07-03 ENCOUNTER — APPOINTMENT (OUTPATIENT)
Dept: VASCULAR SURGERY | Facility: HOSPITAL | Age: 34
End: 2024-07-03
Payer: COMMERCIAL

## 2024-07-03 VITALS
HEART RATE: 78 BPM | DIASTOLIC BLOOD PRESSURE: 92 MMHG | HEIGHT: 62 IN | BODY MASS INDEX: 23.55 KG/M2 | TEMPERATURE: 98.2 F | WEIGHT: 128 LBS | RESPIRATION RATE: 18 BRPM | OXYGEN SATURATION: 98 % | SYSTOLIC BLOOD PRESSURE: 109 MMHG

## 2024-07-03 DIAGNOSIS — Z87.718 HISTORY OF POLYCYSTIC KIDNEY DISEASE: ICD-10-CM

## 2024-07-03 DIAGNOSIS — N23 RENAL COLIC: ICD-10-CM

## 2024-07-03 DIAGNOSIS — R10.9 FLANK PAIN: Primary | ICD-10-CM

## 2024-07-03 LAB
ALBUMIN SERPL-MCNC: 4 G/DL (ref 3.5–5)
ALBUMIN/GLOB SERPL: 1.1 (ref 1.1–2.2)
ALP SERPL-CCNC: 82 U/L (ref 45–117)
ALT SERPL-CCNC: 17 U/L (ref 12–78)
ANION GAP SERPL CALC-SCNC: 5 MMOL/L (ref 5–15)
APPEARANCE UR: CLEAR
AST SERPL-CCNC: 15 U/L (ref 15–37)
BACTERIA URNS QL MICRO: NEGATIVE /HPF
BASOPHILS # BLD: 0 K/UL (ref 0–0.1)
BASOPHILS NFR BLD: 0 % (ref 0–1)
BILIRUB SERPL-MCNC: 0.4 MG/DL (ref 0.2–1)
BILIRUB UR QL: NEGATIVE
BUN SERPL-MCNC: 15 MG/DL (ref 6–20)
BUN/CREAT SERPL: 20 (ref 12–20)
CALCIUM SERPL-MCNC: 9.3 MG/DL (ref 8.5–10.1)
CHLORIDE SERPL-SCNC: 109 MMOL/L (ref 97–108)
CO2 SERPL-SCNC: 26 MMOL/L (ref 21–32)
COLOR UR: ABNORMAL
CREAT SERPL-MCNC: 0.74 MG/DL (ref 0.55–1.02)
DIFFERENTIAL METHOD BLD: NORMAL
ECHO BSA: 1.59 M2
EOSINOPHIL # BLD: 0 K/UL (ref 0–0.4)
EOSINOPHIL NFR BLD: 1 % (ref 0–7)
EPITH CASTS URNS QL MICRO: ABNORMAL /LPF
ERYTHROCYTE [DISTWIDTH] IN BLOOD BY AUTOMATED COUNT: 13.4 % (ref 11.5–14.5)
GLOBULIN SER CALC-MCNC: 3.8 G/DL (ref 2–4)
GLUCOSE SERPL-MCNC: 75 MG/DL (ref 65–100)
GLUCOSE UR STRIP.AUTO-MCNC: NEGATIVE MG/DL
HCG UR QL: NEGATIVE
HCT VFR BLD AUTO: 38.1 % (ref 35–47)
HGB BLD-MCNC: 12.5 G/DL (ref 11.5–16)
HGB UR QL STRIP: ABNORMAL
IMM GRANULOCYTES # BLD AUTO: 0 K/UL (ref 0–0.04)
IMM GRANULOCYTES NFR BLD AUTO: 0 % (ref 0–0.5)
KETONES UR QL STRIP.AUTO: NEGATIVE MG/DL
LEUKOCYTE ESTERASE UR QL STRIP.AUTO: NEGATIVE
LYMPHOCYTES # BLD: 1.7 K/UL (ref 0.8–3.5)
LYMPHOCYTES NFR BLD: 38 % (ref 12–49)
MCH RBC QN AUTO: 27.1 PG (ref 26–34)
MCHC RBC AUTO-ENTMCNC: 32.8 G/DL (ref 30–36.5)
MCV RBC AUTO: 82.5 FL (ref 80–99)
MONOCYTES # BLD: 0.4 K/UL (ref 0–1)
MONOCYTES NFR BLD: 9 % (ref 5–13)
NEUTS SEG # BLD: 2.4 K/UL (ref 1.8–8)
NEUTS SEG NFR BLD: 52 % (ref 32–75)
NITRITE UR QL STRIP.AUTO: NEGATIVE
NRBC # BLD: 0 K/UL (ref 0–0.01)
NRBC BLD-RTO: 0 PER 100 WBC
PH UR STRIP: 5.5 (ref 5–8)
PLATELET # BLD AUTO: 225 K/UL (ref 150–400)
PMV BLD AUTO: 10.7 FL (ref 8.9–12.9)
POTASSIUM SERPL-SCNC: 3.6 MMOL/L (ref 3.5–5.1)
PROT SERPL-MCNC: 7.8 G/DL (ref 6.4–8.2)
PROT UR STRIP-MCNC: NEGATIVE MG/DL
RBC # BLD AUTO: 4.62 M/UL (ref 3.8–5.2)
RBC #/AREA URNS HPF: ABNORMAL /HPF (ref 0–5)
SODIUM SERPL-SCNC: 140 MMOL/L (ref 136–145)
SP GR UR REFRACTOMETRY: 1.02 (ref 1–1.03)
URINE CULTURE IF INDICATED: ABNORMAL
UROBILINOGEN UR QL STRIP.AUTO: 1 EU/DL (ref 0.2–1)
WBC # BLD AUTO: 4.6 K/UL (ref 3.6–11)
WBC URNS QL MICRO: ABNORMAL /HPF (ref 0–4)

## 2024-07-03 PROCEDURE — 99284 EMERGENCY DEPT VISIT MOD MDM: CPT

## 2024-07-03 PROCEDURE — 81001 URINALYSIS AUTO W/SCOPE: CPT

## 2024-07-03 PROCEDURE — 85025 COMPLETE CBC W/AUTO DIFF WBC: CPT

## 2024-07-03 PROCEDURE — 76770 US EXAM ABDO BACK WALL COMP: CPT

## 2024-07-03 PROCEDURE — 36415 COLL VENOUS BLD VENIPUNCTURE: CPT

## 2024-07-03 PROCEDURE — 80053 COMPREHEN METABOLIC PANEL: CPT

## 2024-07-03 PROCEDURE — 81025 URINE PREGNANCY TEST: CPT

## 2024-07-03 PROCEDURE — 93971 EXTREMITY STUDY: CPT

## 2024-07-03 ASSESSMENT — PAIN - FUNCTIONAL ASSESSMENT: PAIN_FUNCTIONAL_ASSESSMENT: 0-10

## 2024-07-03 ASSESSMENT — LIFESTYLE VARIABLES
HOW MANY STANDARD DRINKS CONTAINING ALCOHOL DO YOU HAVE ON A TYPICAL DAY: PATIENT DOES NOT DRINK
HOW OFTEN DO YOU HAVE A DRINK CONTAINING ALCOHOL: NEVER

## 2024-07-03 ASSESSMENT — PAIN DESCRIPTION - ORIENTATION: ORIENTATION: LEFT;RIGHT

## 2024-07-03 ASSESSMENT — PAIN DESCRIPTION - LOCATION: LOCATION: FLANK

## 2024-07-03 ASSESSMENT — PAIN DESCRIPTION - DESCRIPTORS: DESCRIPTORS: ACHING

## 2024-07-03 ASSESSMENT — PAIN SCALES - GENERAL: PAINLEVEL_OUTOF10: 4
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
## 2024-07-03 NOTE — DISCHARGE INSTRUCTIONS
Discussed visit today.  Please follow-up with your primary care as well as your nephrologist for further evaluation.    Return to the emergency room with any worsening of symptoms.

## 2024-07-03 NOTE — ED TRIAGE NOTES
Patient arrives to the ED for complaints of bilateral flank pain for several months, \"it's been going on long enough\".     Patient also reports \"lumps\" on her legs, history of varicose veins. Reports left leg pain for months.     Denies fever, chills, chest pain, shortness of breath.     History of polycystic kidney disease